# Patient Record
Sex: FEMALE | Race: WHITE | NOT HISPANIC OR LATINO | Employment: UNEMPLOYED | URBAN - METROPOLITAN AREA
[De-identification: names, ages, dates, MRNs, and addresses within clinical notes are randomized per-mention and may not be internally consistent; named-entity substitution may affect disease eponyms.]

---

## 2023-11-30 DIAGNOSIS — Z01.419 ENCOUNTER FOR GYNECOLOGICAL EXAMINATION (GENERAL) (ROUTINE) WITHOUT ABNORMAL FINDINGS: ICD-10-CM

## 2023-12-01 RX ORDER — NORGESTIMATE AND ETHINYL ESTRADIOL 0.25-0.035
1 KIT ORAL DAILY
Qty: 84 TABLET | Refills: 1 | Status: SHIPPED | OUTPATIENT
Start: 2023-12-01 | End: 2024-02-29

## 2024-04-17 ENCOUNTER — OFFICE VISIT (OUTPATIENT)
Dept: OBSTETRICS AND GYNECOLOGY | Facility: CLINIC | Age: 24
End: 2024-04-17
Payer: COMMERCIAL

## 2024-04-17 VITALS
WEIGHT: 123.7 LBS | BODY MASS INDEX: 21.12 KG/M2 | HEIGHT: 64 IN | SYSTOLIC BLOOD PRESSURE: 90 MMHG | DIASTOLIC BLOOD PRESSURE: 70 MMHG

## 2024-04-17 DIAGNOSIS — N93.9 ABNORMAL UTERINE BLEEDING (AUB): Primary | ICD-10-CM

## 2024-04-17 PROCEDURE — 1036F TOBACCO NON-USER: CPT | Performed by: OBSTETRICS & GYNECOLOGY

## 2024-04-17 PROCEDURE — 99214 OFFICE O/P EST MOD 30 MIN: CPT | Performed by: OBSTETRICS & GYNECOLOGY

## 2024-04-17 ASSESSMENT — PAIN SCALES - GENERAL: PAINLEVEL: 0-NO PAIN

## 2024-04-17 ASSESSMENT — PATIENT HEALTH QUESTIONNAIRE - PHQ9
1. LITTLE INTEREST OR PLEASURE IN DOING THINGS: NOT AT ALL
SUM OF ALL RESPONSES TO PHQ9 QUESTIONS 1 AND 2: 0
2. FEELING DOWN, DEPRESSED OR HOPELESS: NOT AT ALL

## 2024-04-17 ASSESSMENT — ENCOUNTER SYMPTOMS
OCCASIONAL FEELINGS OF UNSTEADINESS: 0
DEPRESSION: 0
LOSS OF SENSATION IN FEET: 0

## 2024-04-17 NOTE — PROGRESS NOTES
Martita Mcintosh is a 24 y.o. female,  who presented with  AUB    Heavy periods   Irregular   Cramping  Pressure   Was on OCP was irregular and stopped in  . Still irregular       Obstetrical History:  OB History          0    Para   0    Term   0       0    AB   0    Living   0         SAB   0    IAB   0    Ectopic   0    Multiple   0    Live Births   0                    Gynecological history:  Menarche: 14   years old   Periods irregular      Contraceptive history:  Contraception:    Currently using ocp           Vaginal discharge    No  Menopausal symptoms No        Last Pap:  History of abnormal pap exams? Yes      Past Medical History:   Diagnosis Date    Encounter for routine child health examination without abnormal findings 2018    Encounter for routine child health examination without abnormal findings    Inadequate sleep hygiene 2017    History of difficulty sleeping    Other problems related to lifestyle 2017    Deliberate self-cutting    Other symptoms and signs involving emotional state 2017    Emotional upset    Personal history of diseases of the skin and subcutaneous tissue 2016    History of eczema    Personal history of other diseases of the female genital tract 10/21/2017    History of menstrual disorder     History reviewed. No pertinent surgical history.  No family history on file.  Social History     Tobacco Use    Smoking status: Never    Smokeless tobacco: Never   Vaping Use    Vaping status: Never Used   Substance Use Topics    Alcohol use: Yes     Comment: socially    Drug use: Never     Social History     Tobacco Use   Smoking Status Never   Smokeless Tobacco Never       Current Outpatient Medications on File Prior to Visit   Medication Sig Dispense Refill    norgestimate-ethinyl estradioL (Cat) 0.25-35 mg-mcg tablet TAKE 1 TABLET BY MOUTH EVERY DAY FOR 90 DAYS 84 tablet 1     No current facility-administered medications on file prior  "to visit.     Allergies   Allergen Reactions    Penicillins Rash         REVIEW OF SYSTEMS:    General: No weight loss, malaise or fevers or other constitutional symptoms.  Neuro: No history of TIA's, stroke,.  No neurological symptoms or problems. No visual changes  Respiratory: No history of respiratory/pulmonary symptoms or problems.  Cardiovascular: No history of cardiovascular symptoms or problems.  GI: No history of GI symptoms or problems.   : No history of UTI in past 6 weeks.  No history of  symptoms or problems.  BREASTS: No skin dimpling, nipple discharge or masses.  Endocrine: No history of diabetes. No Thyroid symptoms  Hematology: No history of bleeding or clotting disorder.  Skin: Negative for lesions, rash, and itching.      PHYSICAL EXAMINATION:    BP 90/70 Comment: pt. c/o lightheaded.  Ht 1.622 m (5' 3.86\")   Wt 56.1 kg (123 lb 11.2 oz)   LMP 2024 (Exact Date)   BMI 21.33 kg/m²   GENERAL: pleasant, female in no apparent distress  HEENT: Normocephalic, atraumatic, mucus membranes moist and no lesions  NEURO: alert and oriented x3,exam grossly non-focal  NECK: Supple, full range of motion, no adenopathy and thyroid normal  DERMATOLOGY: Normal, without lesions, non-icteric and non-hirsute       ABDOMEN: soft, non-tender and no masses  PELVIC:  uterus normal size, shape and consistency, no adnexal masses and non-tender    ASSESSMENT and Plan:    Martita Mcintosh is a 24 y.o. female,  who  presented with AUB    Plan is  - I  ordered transvaginal ultrasound    - I will also do a hormonal panel            Kaur Ross MD    "

## 2024-04-18 ENCOUNTER — HOSPITAL ENCOUNTER (OUTPATIENT)
Dept: RADIOLOGY | Facility: HOSPITAL | Age: 24
Discharge: HOME | End: 2024-04-18
Payer: COMMERCIAL

## 2024-04-18 DIAGNOSIS — N93.9 ABNORMAL UTERINE BLEEDING (AUB): ICD-10-CM

## 2024-04-18 PROCEDURE — 76830 TRANSVAGINAL US NON-OB: CPT | Performed by: RADIOLOGY

## 2024-04-18 PROCEDURE — 76856 US EXAM PELVIC COMPLETE: CPT | Performed by: RADIOLOGY

## 2024-04-18 PROCEDURE — 76856 US EXAM PELVIC COMPLETE: CPT

## 2024-04-19 ENCOUNTER — LAB (OUTPATIENT)
Dept: LAB | Facility: LAB | Age: 24
End: 2024-04-19
Payer: COMMERCIAL

## 2024-04-19 DIAGNOSIS — N93.9 ABNORMAL UTERINE BLEEDING (AUB): ICD-10-CM

## 2024-04-19 LAB
HCG SERPL-ACNC: <1 MIU/ML
HOLD SPECIMEN: NORMAL

## 2024-04-19 PROCEDURE — 83001 ASSAY OF GONADOTROPIN (FSH): CPT

## 2024-04-19 PROCEDURE — 84702 CHORIONIC GONADOTROPIN TEST: CPT

## 2024-04-19 PROCEDURE — 83002 ASSAY OF GONADOTROPIN (LH): CPT

## 2024-04-19 PROCEDURE — 84403 ASSAY OF TOTAL TESTOSTERONE: CPT

## 2024-04-19 PROCEDURE — 82670 ASSAY OF TOTAL ESTRADIOL: CPT

## 2024-04-19 PROCEDURE — 36415 COLL VENOUS BLD VENIPUNCTURE: CPT

## 2024-04-20 LAB
ESTRADIOL SERPL-MCNC: 362 PG/ML
FSH SERPL-ACNC: 14.5 IU/L
LH SERPL-ACNC: 46.9 IU/L
TESTOST SERPL-MCNC: 35 NG/DL (ref 0–70)

## 2024-05-23 ENCOUNTER — HOSPITAL ENCOUNTER (EMERGENCY)
Facility: HOSPITAL | Age: 24
Discharge: HOME | End: 2024-05-23
Payer: COMMERCIAL

## 2024-05-23 VITALS
SYSTOLIC BLOOD PRESSURE: 116 MMHG | BODY MASS INDEX: 21.26 KG/M2 | WEIGHT: 120 LBS | HEART RATE: 98 BPM | DIASTOLIC BLOOD PRESSURE: 83 MMHG | TEMPERATURE: 98.1 F | HEIGHT: 63 IN | RESPIRATION RATE: 16 BRPM | OXYGEN SATURATION: 98 %

## 2024-05-23 DIAGNOSIS — N39.0 URINARY TRACT INFECTION WITH HEMATURIA, SITE UNSPECIFIED: Primary | ICD-10-CM

## 2024-05-23 DIAGNOSIS — R31.9 URINARY TRACT INFECTION WITH HEMATURIA, SITE UNSPECIFIED: Primary | ICD-10-CM

## 2024-05-23 LAB
APPEARANCE UR: ABNORMAL
BACTERIA #/AREA URNS AUTO: ABNORMAL /HPF
BILIRUB UR STRIP.AUTO-MCNC: NEGATIVE MG/DL
COLOR UR: ABNORMAL
GLUCOSE UR STRIP.AUTO-MCNC: NORMAL MG/DL
HCG UR QL IA.RAPID: NEGATIVE
HOLD SPECIMEN: NORMAL
KETONES UR STRIP.AUTO-MCNC: NEGATIVE MG/DL
LEUKOCYTE ESTERASE UR QL STRIP.AUTO: ABNORMAL
MUCOUS THREADS #/AREA URNS AUTO: ABNORMAL /LPF
NITRITE UR QL STRIP.AUTO: NEGATIVE
PH UR STRIP.AUTO: 5.5 [PH]
PROT UR STRIP.AUTO-MCNC: ABNORMAL MG/DL
RBC # UR STRIP.AUTO: ABNORMAL /UL
RBC #/AREA URNS AUTO: >20 /HPF
SP GR UR STRIP.AUTO: 1.02
SQUAMOUS #/AREA URNS AUTO: ABNORMAL /HPF
UROBILINOGEN UR STRIP.AUTO-MCNC: NORMAL MG/DL
WBC #/AREA URNS AUTO: >50 /HPF
WBC CLUMPS #/AREA URNS AUTO: ABNORMAL /HPF

## 2024-05-23 PROCEDURE — 99283 EMERGENCY DEPT VISIT LOW MDM: CPT

## 2024-05-23 PROCEDURE — 81001 URINALYSIS AUTO W/SCOPE: CPT | Performed by: STUDENT IN AN ORGANIZED HEALTH CARE EDUCATION/TRAINING PROGRAM

## 2024-05-23 PROCEDURE — 81025 URINE PREGNANCY TEST: CPT | Performed by: STUDENT IN AN ORGANIZED HEALTH CARE EDUCATION/TRAINING PROGRAM

## 2024-05-23 PROCEDURE — 87186 SC STD MICRODIL/AGAR DIL: CPT | Mod: WESLAB | Performed by: STUDENT IN AN ORGANIZED HEALTH CARE EDUCATION/TRAINING PROGRAM

## 2024-05-23 RX ORDER — NITROFURANTOIN 25; 75 MG/1; MG/1
100 CAPSULE ORAL 2 TIMES DAILY
Qty: 10 CAPSULE | Refills: 0 | Status: SHIPPED | OUTPATIENT
Start: 2024-05-23 | End: 2024-05-28

## 2024-05-23 RX ORDER — PHENAZOPYRIDINE HYDROCHLORIDE 95 MG/1
95 TABLET ORAL 3 TIMES DAILY PRN
Qty: 10 TABLET | Refills: 0 | Status: SHIPPED | OUTPATIENT
Start: 2024-05-23 | End: 2024-05-26

## 2024-05-23 ASSESSMENT — COLUMBIA-SUICIDE SEVERITY RATING SCALE - C-SSRS
1. IN THE PAST MONTH, HAVE YOU WISHED YOU WERE DEAD OR WISHED YOU COULD GO TO SLEEP AND NOT WAKE UP?: NO
2. HAVE YOU ACTUALLY HAD ANY THOUGHTS OF KILLING YOURSELF?: NO
6. HAVE YOU EVER DONE ANYTHING, STARTED TO DO ANYTHING, OR PREPARED TO DO ANYTHING TO END YOUR LIFE?: NO

## 2024-05-23 ASSESSMENT — LIFESTYLE VARIABLES
TOTAL SCORE: 0
EVER FELT BAD OR GUILTY ABOUT YOUR DRINKING: NO
HAVE YOU EVER FELT YOU SHOULD CUT DOWN ON YOUR DRINKING: NO
HAVE PEOPLE ANNOYED YOU BY CRITICIZING YOUR DRINKING: NO
EVER HAD A DRINK FIRST THING IN THE MORNING TO STEADY YOUR NERVES TO GET RID OF A HANGOVER: NO

## 2024-05-23 ASSESSMENT — PAIN - FUNCTIONAL ASSESSMENT: PAIN_FUNCTIONAL_ASSESSMENT: 0-10

## 2024-05-23 ASSESSMENT — PAIN DESCRIPTION - PROGRESSION: CLINICAL_PROGRESSION: NOT CHANGED

## 2024-05-23 NOTE — ED PROVIDER NOTES
HPI   Chief Complaint   Patient presents with    Blood in Urine       HPI  Patient is an otherwise healthy 24-year-old female presenting for evaluation of pain with urination and blood in the urine.  Patient states she started having burning with urination 4 days ago that has gotten worse.  She states this morning she had streaks of blood in her urine which concerned her thus she presented to the ER.  She is also endorsing suprapubic abdominal pain.  States she feels that she cannot completely void when she is voiding.  Endorses urinary frequency.  Otherwise denies flank pain, nausea, vomiting, fever.  States that she has 1 partner who she has been with for 3 years now and lives with that she is not concerned for STDs at this time.                  Olga Coma Scale Score: 15                     Patient History   Past Medical History:   Diagnosis Date    Encounter for routine child health examination without abnormal findings 08/20/2018    Encounter for routine child health examination without abnormal findings    Inadequate sleep hygiene 06/29/2017    History of difficulty sleeping    Other problems related to lifestyle 06/29/2017    Deliberate self-cutting    Other symptoms and signs involving emotional state 06/29/2017    Emotional upset    Personal history of diseases of the skin and subcutaneous tissue 06/27/2016    History of eczema    Personal history of other diseases of the female genital tract 10/21/2017    History of menstrual disorder     No past surgical history on file.  No family history on file.  Social History     Tobacco Use    Smoking status: Never    Smokeless tobacco: Never   Vaping Use    Vaping status: Never Used   Substance Use Topics    Alcohol use: Yes     Comment: socially    Drug use: Never       Physical Exam   ED Triage Vitals [05/23/24 0858]   Temperature Heart Rate Respirations BP   36.7 °C (98.1 °F) 98 16 116/83      Pulse Ox Temp Source Heart Rate Source Patient Position   98 %  Temporal Monitor Sitting      BP Location FiO2 (%)     Right arm --       Physical Exam  Vitals and nursing note reviewed.   Constitutional:       General: She is not in acute distress.     Appearance: She is well-developed.   HENT:      Head: Normocephalic and atraumatic.   Eyes:      Conjunctiva/sclera: Conjunctivae normal.   Cardiovascular:      Rate and Rhythm: Normal rate and regular rhythm.      Heart sounds: No murmur heard.  Pulmonary:      Effort: Pulmonary effort is normal. No respiratory distress.      Breath sounds: Normal breath sounds.   Abdominal:      Palpations: Abdomen is soft.      Tenderness: There is no abdominal tenderness.   Musculoskeletal:         General: No swelling.      Cervical back: Neck supple.   Skin:     General: Skin is warm and dry.      Capillary Refill: Capillary refill takes less than 2 seconds.   Neurological:      Mental Status: She is alert.   Psychiatric:         Mood and Affect: Mood normal.         ED Course & MDM   Diagnoses as of 05/23/24 0938   Urinary tract infection with hematuria, site unspecified       Medical Decision Making  24-year-old female presenting for evaluation of hematuria and painful urination.  Urinalysis and urine pregnancy performed.  Urinalysis significant for urinary tract infection.  Urine pregnancy negative. I have low suspicion for nephrolithiasis, STI, ureterolithiasis, pyelonephritis given history and clinical findings.  Patient given prescription for Macrobid and Azo for treatment of her urinary tract infection.  She was instructed to follow-up with her primary care provider in 1 week for continuation of care.  Stated her understanding of the treatment and follow-up plan and was agreeable.  Discharged in good condition.    Procedure  Procedures     Marta Vidal PA-C  05/23/24 0938

## 2024-05-23 NOTE — DISCHARGE INSTRUCTIONS
Follow-up within 1 week with your primary care provider or OB gynecologist for results of your urine culture and reevaluation of your symptoms after antibiotic treatment .  Please take the Macrobid twice daily for 5 days for treatment of your urinary tract infection.  You can also take Azo that was prescribed to you for symptom relief up to 3 times daily.  Present back to ER for any worsening or new onset of symptoms such as fever, flank pain or increased symptoms despite treatment.

## 2024-05-25 LAB — BACTERIA UR CULT: ABNORMAL

## 2024-05-26 ENCOUNTER — TELEPHONE (OUTPATIENT)
Dept: PHARMACY | Facility: HOSPITAL | Age: 24
End: 2024-05-26
Payer: COMMERCIAL

## 2024-05-26 NOTE — PROGRESS NOTES
EDPD Note: Antibiotics Reviewed and Warranted    Contacted Ms. Martita Mcintosh regarding a positive urine culture that was taken during their recent emergency room visit. I completed education with patient . The patient is being treated appropriately with Macrobid 100 mg BID x 5 days.     Patient presented to the ED with complaints of dysuria. No complicated symptoms noted. She was discharged with Macrobid which is appropriate given the susceptibilities.      Susceptibility data from last 90 days.  Collected Specimen Info Organism Ampicillin Cefazolin Cefazolin (uncomplicated UTIs only) Ciprofloxacin Gentamicin Nitrofurantoin Piperacillin/Tazobactam Trimethoprim/Sulfamethoxazole   05/23/24 Urine from Clean Catch/Voided Escherichia coli  S  S  S  S  S  S  S  S        No further follow up needed from EDPD Team.     Lalita Berger, PharmD

## 2024-05-26 NOTE — TELEPHONE ENCOUNTER
I reviewed the progress note and agree with the resident’s findings and plans as written. Case discussed with resident.    Erna Bauer, GarciaD

## 2024-05-31 ENCOUNTER — OFFICE VISIT (OUTPATIENT)
Dept: OBSTETRICS AND GYNECOLOGY | Facility: CLINIC | Age: 24
End: 2024-05-31
Payer: COMMERCIAL

## 2024-05-31 VITALS
HEIGHT: 63 IN | SYSTOLIC BLOOD PRESSURE: 92 MMHG | WEIGHT: 126 LBS | DIASTOLIC BLOOD PRESSURE: 70 MMHG | BODY MASS INDEX: 22.32 KG/M2

## 2024-05-31 DIAGNOSIS — N93.9 ABNORMAL UTERINE BLEEDING (AUB): ICD-10-CM

## 2024-05-31 DIAGNOSIS — N76.0 ACUTE VAGINITIS: Primary | ICD-10-CM

## 2024-05-31 LAB — PREGNANCY TEST URINE, POC: NEGATIVE

## 2024-05-31 PROCEDURE — 99214 OFFICE O/P EST MOD 30 MIN: CPT | Performed by: OBSTETRICS & GYNECOLOGY

## 2024-05-31 PROCEDURE — 81025 URINE PREGNANCY TEST: CPT | Performed by: OBSTETRICS & GYNECOLOGY

## 2024-05-31 RX ORDER — ETONOGESTREL AND ETHINYL ESTRADIOL VAGINAL RING .015; .12 MG/D; MG/D
1 RING VAGINAL
Qty: 1 EACH | Refills: 3 | Status: SHIPPED | OUTPATIENT
Start: 2024-05-31 | End: 2024-09-20

## 2024-05-31 ASSESSMENT — ENCOUNTER SYMPTOMS
OCCASIONAL FEELINGS OF UNSTEADINESS: 0
LOSS OF SENSATION IN FEET: 0
DEPRESSION: 0

## 2024-05-31 ASSESSMENT — PAIN SCALES - GENERAL: PAINLEVEL: 0-NO PAIN

## 2024-06-01 NOTE — PROGRESS NOTES
Martita Mcintosh is a 24 y.o. female,  who presented with  AUB     AUB     Heavy periods   Irregular   Cramping  Pressure   Was on OCP was irregular and stopped in  . Still irregular     Discussed US and labs    She is concerned about fertility   LH 46, possible midcycle     IU/L 46.9          Obstetrical History:  OB History          0    Para   0    Term   0       0    AB   0    Living   0         SAB   0    IAB   0    Ectopic   0    Multiple   0    Live Births   0                    Gynecological history:  Menarche: 14   years old   Periods irregular            Vaginal discharge    No  Menopausal symptoms No        Last Pap:  History of abnormal pap exams? Yes      Past Medical History:   Diagnosis Date    Encounter for routine child health examination without abnormal findings 2018    Encounter for routine child health examination without abnormal findings    Inadequate sleep hygiene 2017    History of difficulty sleeping    Other problems related to lifestyle 2017    Deliberate self-cutting    Other symptoms and signs involving emotional state 2017    Emotional upset    Personal history of diseases of the skin and subcutaneous tissue 2016    History of eczema    Personal history of other diseases of the female genital tract 10/21/2017    History of menstrual disorder     History reviewed. No pertinent surgical history.  No family history on file.  Social History     Tobacco Use    Smoking status: Never    Smokeless tobacco: Never   Vaping Use    Vaping status: Never Used   Substance Use Topics    Alcohol use: Yes     Comment: socially    Drug use: Never     Social History     Tobacco Use   Smoking Status Never   Smokeless Tobacco Never       Current Outpatient Medications on File Prior to Visit   Medication Sig Dispense Refill    [] nitrofurantoin, macrocrystal-monohydrate, (Macrobid) 100 mg capsule Take 1 capsule (100 mg) by mouth 2 times a day for 5  "days. 10 capsule 0    norgestimate-ethinyl estradioL (Cat) 0.25-35 mg-mcg tablet TAKE 1 TABLET BY MOUTH EVERY DAY FOR 90 DAYS 84 tablet 1    [] phenazopyridine (Urinary Pain Relief) 95 mg tablet Take 1 tablet (95 mg) by mouth 3 times a day as needed for bladder spasms for up to 3 days. 10 tablet 0     No current facility-administered medications on file prior to visit.     Allergies   Allergen Reactions    Penicillins Rash         REVIEW OF SYSTEMS:    General: No weight loss, malaise or fevers or other constitutional symptoms.  Neuro: No history of TIA's, stroke,.  No neurological symptoms or problems. No visual changes  Respiratory: No history of respiratory/pulmonary symptoms or problems.  Cardiovascular: No history of cardiovascular symptoms or problems.  GI: No history of GI symptoms or problems.   : No history of UTI in past 6 weeks.  No history of  symptoms or problems.  BREASTS: No skin dimpling, nipple discharge or masses.  Endocrine: No history of diabetes. No Thyroid symptoms  Hematology: No history of bleeding or clotting disorder.  Skin: Negative for lesions, rash, and itching.      PHYSICAL EXAMINATION:    BP 92/70   Ht 1.6 m (5' 3\")   Wt 57.2 kg (126 lb)   LMP  (LMP Unknown)   BMI 22.32 kg/m²        ASSESSMENT and Plan:    Martita Mcintosh is a 24 y.o. female,  who  presented with AUB      I discussed different methods of treatment including  A- Conservative: declined as she is bleeding  B-Medical including,: NSAIDS, TXA, combined hormonal contraception, Mirena IUD, progestin injections ,  She was counseled about the side effects,   including unscheduled bleeding, cardiovascular risk, venous thromboembolism risk bloating, nausea and breast tenderness.     She opted for nuvaring     Will do AMH     FU 3 month            Kaur Ross MD    "

## 2024-06-24 ENCOUNTER — TELEPHONE (OUTPATIENT)
Dept: OBSTETRICS AND GYNECOLOGY | Facility: CLINIC | Age: 24
End: 2024-06-24
Payer: COMMERCIAL

## 2024-06-24 DIAGNOSIS — N93.9 ABNORMAL UTERINE BLEEDING (AUB): ICD-10-CM

## 2024-06-24 RX ORDER — ETONOGESTREL AND ETHINYL ESTRADIOL VAGINAL RING .015; .12 MG/D; MG/D
1 RING VAGINAL
Qty: 1 EACH | Refills: 3 | Status: SHIPPED | OUTPATIENT
Start: 2024-06-24 | End: 2024-10-14

## 2024-07-17 ENCOUNTER — APPOINTMENT (OUTPATIENT)
Dept: OBSTETRICS AND GYNECOLOGY | Facility: CLINIC | Age: 24
End: 2024-07-17
Payer: COMMERCIAL

## 2024-08-08 ENCOUNTER — TELEPHONE (OUTPATIENT)
Dept: OBSTETRICS AND GYNECOLOGY | Facility: CLINIC | Age: 24
End: 2024-08-08
Payer: COMMERCIAL

## 2024-08-08 DIAGNOSIS — N93.9 ABNORMAL UTERINE BLEEDING (AUB): ICD-10-CM

## 2024-08-08 RX ORDER — ETONOGESTREL AND ETHINYL ESTRADIOL VAGINAL RING .015; .12 MG/D; MG/D
1 RING VAGINAL
Qty: 3 EACH | Refills: 3 | Status: SHIPPED | OUTPATIENT
Start: 2024-08-08 | End: 2025-08-08

## 2024-08-08 NOTE — TELEPHONE ENCOUNTER
Pt. Called stating that her pharmacy said that Nuvaring is only covers by insurance if she gets 3month supply instead of 1mnth supply.

## 2024-08-15 ENCOUNTER — APPOINTMENT (OUTPATIENT)
Dept: OBSTETRICS AND GYNECOLOGY | Facility: CLINIC | Age: 24
End: 2024-08-15
Payer: COMMERCIAL

## 2024-08-20 ENCOUNTER — APPOINTMENT (OUTPATIENT)
Dept: OBSTETRICS AND GYNECOLOGY | Facility: CLINIC | Age: 24
End: 2024-08-20
Payer: COMMERCIAL

## 2024-08-27 ENCOUNTER — APPOINTMENT (OUTPATIENT)
Dept: OBSTETRICS AND GYNECOLOGY | Facility: CLINIC | Age: 24
End: 2024-08-27
Payer: COMMERCIAL

## 2024-09-03 ENCOUNTER — APPOINTMENT (OUTPATIENT)
Dept: OBSTETRICS AND GYNECOLOGY | Facility: CLINIC | Age: 24
End: 2024-09-03
Payer: COMMERCIAL

## 2024-09-04 ENCOUNTER — TELEPHONE (OUTPATIENT)
Dept: OBSTETRICS AND GYNECOLOGY | Facility: CLINIC | Age: 24
End: 2024-09-04
Payer: COMMERCIAL

## 2024-09-04 NOTE — TELEPHONE ENCOUNTER
NP previously seen at [facility] location. Pt with irregular menses. States she sometimes skips months and has irregular cramping. Had previous ultrasound done which showed cysts. PT feels she would like to see a female and someone new to discuss. Appt made with [place] for 9/17/2024

## 2024-09-16 NOTE — PROGRESS NOTES
History Of Present Illness:    Martita Mcintosh is a 24 y.o.  presenting for complaint of irregular bleeding.     When seen today, she states that she has never had a normal menstrual cycle. She underwent menarche at age 14, she states that her periods occur every 2-3 months, lasting for 4 days a week, very light bleeding/spotting, requiring 1-2 tampons a day, denies associated heavy cramping.    In terms of her contraception, she was previously on OCP, which was then discontinued by patient,  and stated that her periods were irregular even with the OCPs. She then started the Nuva Ring in . She is happy with the Nuva Ring. She is not interested in becoming pregnant in the near future but is concerned about future fertility due to her history of irregular periods.     She is currently sexually with one long term partner. Is also using condoms. Does also endorse post coital spotting.     Last Menstrual Period  Patient's last menstrual period was 2024 (exact date).    Menstrual History:  OB History          0    Para   0    Term   0       0    AB   0    Living   0         SAB   0    IAB   0    Ectopic   0    Multiple   0    Live Births   0                Menarche age: 14    Last Pap Smear:  NIL (2023)    Past Medical History:  She has a past medical history of Encounter for routine child health examination without abnormal findings (2018), Inadequate sleep hygiene (2017), Irregular bleeding (2024), Other problems related to lifestyle (2017), Other symptoms and signs involving emotional state (2017), Personal history of diseases of the skin and subcutaneous tissue (2016), and Personal history of other diseases of the female genital tract (10/21/2017).    Past Surgical History:  She has a past surgical history that includes Appendectomy ().      Social History:  She reports that she has never smoked. She has never used smokeless tobacco. She reports  current alcohol use. She reports that she does not use drugs.    Family History:  No family history on file.     Allergies:  Penicillins    Outpatient Medications:  Current Outpatient Medications   Medication Instructions    etonogestreL-ethinyl estradioL (Nuvaring) 0.12-0.015 mg/24 hr vaginal ring 1 each, vaginal, Every 28 days, Insert vaginal ring for 3 weeks, then remove for 1 week.    norgestimate-ethinyl estradioL (Cat) 0.25-35 mg-mcg tablet 1 tablet, oral, Daily     ROS:  As detailed above.    Physical Exam:    Last Recorded Vitals:  Visit Vitals  /78     OBGyn Exam     Physical Exam    General: Alert and oriented, in no acute distress.  Psych: Normal affect and mood. Able to answer questions appropriately.   Heart: Regular rate.  Lungs: Non labored respirations.  GYN:  Speculum:  Vulva: Normal architecture without erythema, masses, or lesions.   Vagina: Normal moist mucosa without lesions, masses, or atrophy. No abnormal vaginal discharge.   Cervix: Ectropion noted at the cervical os with some circumferential surrounding  erythema and possible nabothian cyst  Bimanual:   Cervix: No cervical motion tenderness.  Uterus: Normal, mobile, non-enlarged, non tender uterus.  Adnexa: Normal without tenderness, nodularity, masses, or lesions.    Imaging:  US PELVIS TRANSABDOMINAL WITH TRANSVAGINAL;  4/18/2024 9:49 am  INDICATION:  Signs/Symptoms:AUB.  COMPARISON:  None.  ACCESSION NUMBER(S):  [id number]  ORDERING CLINICIAN:  JADIEL SWEENEY  TECHNIQUE:  Multiple multiplanar static gray scale, color and spectral waveform  sonographic images of the pelvis were obtained.  Transabdominal and  endovaginal ultrasound was performed.  FINDINGS:  UTERUS:  Uterus is normal in size with a smooth external contour measuring  7.8 x 3.7 x 4.5 cm.  No discrete myometrial lesion is identified.  ENDOMETRIUM:  Endometrial complex is  homogeneous in echotexture with thickness of  1.1 cm.  RIGHT ADNEXA:  Right ovary is normal in  size containing small physiologic  cysts/follicles. Right ovary measures  3.5 x 3.1 x 3.2 cm. A right  ovarian dominant round anechoic simple cyst measuring 2.1 x 1.9 x 2.1  cm likely is physiologic/benign. Blood flow can be seen to the right  ovary.   No additional adnexal mass or fluid collection is seen.  LEFT ADNEXA:  Left ovary is normal in size containing small physiologic  cysts/follicles. Left ovary measures  3.1 x 1.6 x 3.9 cm. Blood flow  can be demonstrated to the left ovary.   No additional adnexal mass  or fluid collection is seen.  CUL DE SAC:  No free fluid is visualized.  IMPRESSION:  1.  No abnormal endometrial thickening.  2. Normal size of both ovaries with demonstrated blood flow. A right  ovarian dominant 2.1 cm anechoic simple cyst likely is  physiologic/benign.    Assessment/Plan     .Irregular bleeding  -Patient with irregular bleeding since the onset of her menses at age 14  -Previously underwent workup including a normal LH, FSH, estradiol, testosterone  -Reviewed her previous pelvic ultrasound and we discussed that get that since it was normal this rules out structural causes of her abnormal bleeding  -AMH was previously ordered but never done  -UPT negative in the office today  -Given her concern for future fertility and cause of irregular bleeding, she was encouraged patient to get AMH done; also added on a CBC, TSH, and prolactin today to further evaluate the cause of her irregular bleeding      Screening for cervical cancer  -Last Pap smear showed negative intraepithelial lesion done in 7/2023  - Given her complaint of postcoital spotting as well as ectropion visualized on speculum examination today, a repeat Pap smear was done at today's visit 9/2024       Problem List Items Addressed This Visit       Irregular menstrual bleeding    -  Primary    Relevant Orders    TSH with reflex to Free T4 if abnormal    CBC    Prolactin    Encounter for gynecological examination (general) (routine)  without abnormal findings        PCB (post coital bleeding)        Relevant Orders    THINPREP PAP            Gideon Stevens MD

## 2024-09-17 ENCOUNTER — OFFICE VISIT (OUTPATIENT)
Dept: OBSTETRICS AND GYNECOLOGY | Facility: CLINIC | Age: 24
End: 2024-09-17
Payer: COMMERCIAL

## 2024-09-17 ENCOUNTER — LAB (OUTPATIENT)
Dept: LAB | Facility: LAB | Age: 24
End: 2024-09-17
Payer: COMMERCIAL

## 2024-09-17 VITALS — DIASTOLIC BLOOD PRESSURE: 78 MMHG | SYSTOLIC BLOOD PRESSURE: 104 MMHG | WEIGHT: 125.8 LBS | BODY MASS INDEX: 22.28 KG/M2

## 2024-09-17 DIAGNOSIS — N92.6 IRREGULAR BLEEDING: ICD-10-CM

## 2024-09-17 DIAGNOSIS — Z01.419 ENCOUNTER FOR GYNECOLOGICAL EXAMINATION (GENERAL) (ROUTINE) WITHOUT ABNORMAL FINDINGS: ICD-10-CM

## 2024-09-17 DIAGNOSIS — N93.0 PCB (POST COITAL BLEEDING): ICD-10-CM

## 2024-09-17 DIAGNOSIS — N76.0 ACUTE VAGINITIS: ICD-10-CM

## 2024-09-17 DIAGNOSIS — N92.6 IRREGULAR MENSTRUAL BLEEDING: ICD-10-CM

## 2024-09-17 DIAGNOSIS — N92.6 IRREGULAR MENSTRUAL BLEEDING: Primary | ICD-10-CM

## 2024-09-17 DIAGNOSIS — Z12.4 SCREENING FOR CERVICAL CANCER: ICD-10-CM

## 2024-09-17 DIAGNOSIS — N93.9 ABNORMAL UTERINE BLEEDING (AUB): ICD-10-CM

## 2024-09-17 LAB
ERYTHROCYTE [DISTWIDTH] IN BLOOD BY AUTOMATED COUNT: 12.2 % (ref 11.5–14.5)
HCT VFR BLD AUTO: 43.4 % (ref 36–46)
HGB BLD-MCNC: 14.2 G/DL (ref 12–16)
MCH RBC QN AUTO: 28.7 PG (ref 26–34)
MCHC RBC AUTO-ENTMCNC: 32.7 G/DL (ref 32–36)
MCV RBC AUTO: 88 FL (ref 80–100)
NRBC BLD-RTO: 0 /100 WBCS (ref 0–0)
PLATELET # BLD AUTO: 220 X10*3/UL (ref 150–450)
PREGNANCY TEST URINE, POC: NEGATIVE
RBC # BLD AUTO: 4.95 X10*6/UL (ref 4–5.2)
TSH SERPL-ACNC: 1.05 MIU/L (ref 0.44–3.98)
WBC # BLD AUTO: 6.2 X10*3/UL (ref 4.4–11.3)

## 2024-09-17 PROCEDURE — 84443 ASSAY THYROID STIM HORMONE: CPT

## 2024-09-17 PROCEDURE — 36415 COLL VENOUS BLD VENIPUNCTURE: CPT

## 2024-09-17 PROCEDURE — 81025 URINE PREGNANCY TEST: CPT | Performed by: STUDENT IN AN ORGANIZED HEALTH CARE EDUCATION/TRAINING PROGRAM

## 2024-09-17 PROCEDURE — 99213 OFFICE O/P EST LOW 20 MIN: CPT | Performed by: STUDENT IN AN ORGANIZED HEALTH CARE EDUCATION/TRAINING PROGRAM

## 2024-09-17 PROCEDURE — 85027 COMPLETE CBC AUTOMATED: CPT

## 2024-09-17 PROCEDURE — 1036F TOBACCO NON-USER: CPT | Performed by: STUDENT IN AN ORGANIZED HEALTH CARE EDUCATION/TRAINING PROGRAM

## 2024-09-17 PROCEDURE — 83516 IMMUNOASSAY NONANTIBODY: CPT

## 2024-09-17 PROCEDURE — 84146 ASSAY OF PROLACTIN: CPT

## 2024-09-17 PROCEDURE — 88175 CYTOPATH C/V AUTO FLUID REDO: CPT | Mod: TC,GCY,WESLAB | Performed by: STUDENT IN AN ORGANIZED HEALTH CARE EDUCATION/TRAINING PROGRAM

## 2024-09-17 ASSESSMENT — LIFESTYLE VARIABLES
HOW OFTEN DO YOU HAVE SIX OR MORE DRINKS ON ONE OCCASION: LESS THAN MONTHLY
SKIP TO QUESTIONS 9-10: 0
HOW OFTEN DO YOU HAVE A DRINK CONTAINING ALCOHOL: MONTHLY OR LESS
AUDIT-C TOTAL SCORE: 2
HOW MANY STANDARD DRINKS CONTAINING ALCOHOL DO YOU HAVE ON A TYPICAL DAY: 1 OR 2

## 2024-09-17 ASSESSMENT — PATIENT HEALTH QUESTIONNAIRE - PHQ9
1. LITTLE INTEREST OR PLEASURE IN DOING THINGS: NOT AT ALL
1. LITTLE INTEREST OR PLEASURE IN DOING THINGS: NOT AT ALL
SUM OF ALL RESPONSES TO PHQ9 QUESTIONS 1 & 2: 0
2. FEELING DOWN, DEPRESSED OR HOPELESS: NOT AT ALL
SUM OF ALL RESPONSES TO PHQ9 QUESTIONS 1 & 2: 0
2. FEELING DOWN, DEPRESSED OR HOPELESS: NOT AT ALL

## 2024-09-17 ASSESSMENT — PAIN SCALES - GENERAL: PAINLEVEL: 0-NO PAIN

## 2024-09-17 ASSESSMENT — ENCOUNTER SYMPTOMS
OCCASIONAL FEELINGS OF UNSTEADINESS: 0
LOSS OF SENSATION IN FEET: 0
DEPRESSION: 0

## 2024-09-17 NOTE — ASSESSMENT & PLAN NOTE
-Patient with irregular bleeding since the onset of her menses at age 14  -Previously underwent workup including a normal LH, FSH, estradiol, testosterone  -Reviewed her previous pelvic ultrasound and we discussed that get that since it was normal this rules out structural causes of her abnormal bleeding  -AMH was previously ordered but never done  -UPT negative in the office today  -Given her concern for future fertility and cause of irregular bleeding, she was encouraged patient to get AMH done; also added on a CBC, TSH, and prolactin today to further evaluate the cause of her irregular bleeding

## 2024-09-17 NOTE — ASSESSMENT & PLAN NOTE
-Last Pap smear showed negative intraepithelial lesion done in 7/2023  - Given her complaint of postcoital spotting as well as ectropion visualized on speculum examination today, a repeat Pap smear was done at today's visit 9/2024

## 2024-09-18 LAB — PROLACTIN SERPL-MCNC: 11.3 UG/L (ref 3–20)

## 2024-09-20 LAB
CYTOLOGY CMNT CVX/VAG CYTO-IMP: NORMAL
LAB AP HPV HR: NORMAL
LABORATORY COMMENT REPORT: NORMAL
LMP START DATE: NORMAL
MIS SERPL-MCNC: 8.62 NG/ML (ref 0.4–16.02)
PATH REPORT.TOTAL CANCER: NORMAL

## 2025-01-09 ENCOUNTER — OFFICE VISIT (OUTPATIENT)
Dept: URGENT CARE | Age: 25
End: 2025-01-09
Payer: COMMERCIAL

## 2025-01-09 VITALS
HEART RATE: 70 BPM | TEMPERATURE: 98 F | OXYGEN SATURATION: 100 % | SYSTOLIC BLOOD PRESSURE: 121 MMHG | DIASTOLIC BLOOD PRESSURE: 70 MMHG | RESPIRATION RATE: 16 BRPM

## 2025-01-09 DIAGNOSIS — R09.82 PND (POST-NASAL DRIP): ICD-10-CM

## 2025-01-09 DIAGNOSIS — R05.1 ACUTE COUGH: Primary | ICD-10-CM

## 2025-01-09 PROCEDURE — 99203 OFFICE O/P NEW LOW 30 MIN: CPT | Performed by: REGISTERED NURSE

## 2025-01-09 ASSESSMENT — ENCOUNTER SYMPTOMS
SHORTNESS OF BREATH: 0
FATIGUE: 0
ABDOMINAL PAIN: 0
SORE THROAT: 0
DIARRHEA: 0
WHEEZING: 0
CHILLS: 0
SINUS PRESSURE: 0
HOARSE VOICE: 0
SINUS PAIN: 0
SWOLLEN GLANDS: 1
FEVER: 0
HEADACHES: 0
COUGH: 1
RHINORRHEA: 0
NAUSEA: 0
VOMITING: 0

## 2025-01-09 NOTE — PATIENT INSTRUCTIONS
Post Nasal Drip:  Claritin or Zyrtec everyday for one week   Humidified air  OTC nasal spray as needed   Mucinex DM  Tylenol/ibuprofen as needed for pain/discomfort

## 2025-01-09 NOTE — PROGRESS NOTES
Subjective   Patient ID: Martita Mcintosh is a 25 y.o. female. They present today with a chief complaint of Cough (1 week / chest pain ).    History of Present Illness    History provided by:  Patient  Sinusitis  Pain details:     Location:  Maxillary    Quality:  Aching and pressure    Severity:  Moderate    Duration:  5 days    Timing:  Intermittent  Duration:  5 days  Progression:  Waxing and waning  Chronicity:  New  Relieved by:  Nothing  Worsened by:  Lying down  Ineffective treatments:  None tried  Associated symptoms: congestion, cough and swollen glands    Associated symptoms: no chest pain, no chills, no ear pain, no fatigue, no fever, no headaches, no hoarse voice, no nausea, no rhinorrhea, no shortness of breath, no sneezing, no sore throat, no vomiting and no wheezing        Past Medical History  Allergies as of 01/09/2025 - Reviewed 01/09/2025   Allergen Reaction Noted    Penicillins Rash 08/28/2020       (Not in a hospital admission)       Past Medical History:   Diagnosis Date    Encounter for routine child health examination without abnormal findings 08/20/2018    Encounter for routine child health examination without abnormal findings    Inadequate sleep hygiene 06/29/2017    History of difficulty sleeping    Irregular bleeding 09/17/2024          Other problems related to lifestyle 06/29/2017    Deliberate self-cutting    Other symptoms and signs involving emotional state 06/29/2017    Emotional upset    Personal history of diseases of the skin and subcutaneous tissue 06/27/2016    History of eczema    Personal history of other diseases of the female genital tract 10/21/2017    History of menstrual disorder       Past Surgical History:   Procedure Laterality Date    APPENDECTOMY  2012        reports that she has never smoked. She has never used smokeless tobacco. She reports current alcohol use. She reports that she does not use drugs.    Review of Systems  Review of Systems   Constitutional:   Negative for chills, fatigue and fever.   HENT:  Positive for congestion and postnasal drip. Negative for ear pain, hoarse voice, rhinorrhea, sinus pressure, sinus pain, sneezing and sore throat.    Respiratory:  Positive for cough. Negative for shortness of breath and wheezing.    Cardiovascular:  Negative for chest pain.   Gastrointestinal:  Negative for abdominal pain, diarrhea, nausea and vomiting.   Neurological:  Negative for headaches.   All other systems reviewed and are negative.                                 Objective    Vitals:    01/09/25 1601   BP: 121/70   Pulse: 70   Resp: 16   Temp: 36.7 °C (98 °F)   SpO2: 100%     No LMP recorded.    Physical Exam  Vitals and nursing note reviewed.   Constitutional:       Appearance: Normal appearance.   HENT:      Head: Normocephalic.      Right Ear: Tympanic membrane normal.      Left Ear: Tympanic membrane normal.      Nose: Rhinorrhea present. Rhinorrhea is clear.      Right Sinus: No maxillary sinus tenderness or frontal sinus tenderness.      Left Sinus: No maxillary sinus tenderness or frontal sinus tenderness.      Mouth/Throat:      Mouth: Mucous membranes are moist.      Pharynx: Postnasal drip present.   Eyes:      Pupils: Pupils are equal, round, and reactive to light.   Cardiovascular:      Rate and Rhythm: Normal rate and regular rhythm.   Pulmonary:      Effort: Pulmonary effort is normal.      Breath sounds: Normal breath sounds.   Abdominal:      General: Bowel sounds are normal.      Palpations: Abdomen is soft.   Skin:     General: Skin is warm and dry.      Capillary Refill: Capillary refill takes less than 2 seconds.   Neurological:      General: No focal deficit present.      Mental Status: She is alert.   Psychiatric:         Mood and Affect: Mood normal.         Procedures    Point of Care Test & Imaging Results from this visit  No results found for this visit on 01/09/25.   No results found.    Diagnostic study results (if any) were  reviewed by [facility].    Assessment/Plan   Allergies, medications, history, and pertinent labs/EKGs/Imaging reviewed by Crystal L Severino, APRN-CNP.     Medical Decision Making  Vital signs are stable, afebrile.  Chest clear, heart is regular, belly soft and nontender.  ENT exam as above consistent with cough with pnd.  At time of discharge patient was clinically well-appearing and HDS for outpatient management. The patient and/or family was educated regarding diagnosis, supportive care, OTC and Rx medications. The patient and/or family was given the opportunity to ask questions prior to discharge.  They verbalized understanding of my discussion of the plans for treatment, expected course, indications to return to  or seek further evaluation in ED, and the need for timely follow up as directed.   They were provided with a work/school excuse if requested.      Orders and Diagnoses  Diagnoses and all orders for this visit:  Acute cough  PND (post-nasal drip)   Post Nasal Drip:  Claritin or Zyrtec everyday for one week   Humidified air  OTC nasal spray as needed   Mucinex DM  Tylenol/ibuprofen as needed for pain/discomfort      Medical Admin Record      Patient disposition: Home    Electronically signed by [facility]  4:52 PM